# Patient Record
Sex: FEMALE | Race: WHITE | NOT HISPANIC OR LATINO
[De-identification: names, ages, dates, MRNs, and addresses within clinical notes are randomized per-mention and may not be internally consistent; named-entity substitution may affect disease eponyms.]

---

## 2017-08-02 ENCOUNTER — APPOINTMENT (OUTPATIENT)
Dept: NEUROLOGY | Facility: CLINIC | Age: 22
End: 2017-08-02

## 2018-07-25 ENCOUNTER — APPOINTMENT (OUTPATIENT)
Dept: NEUROLOGY | Facility: CLINIC | Age: 23
End: 2018-07-25
Payer: COMMERCIAL

## 2018-07-25 VITALS
HEART RATE: 72 BPM | BODY MASS INDEX: 26.21 KG/M2 | HEIGHT: 59 IN | SYSTOLIC BLOOD PRESSURE: 118 MMHG | WEIGHT: 130 LBS | DIASTOLIC BLOOD PRESSURE: 78 MMHG

## 2018-07-25 DIAGNOSIS — G40.909 EPILEPSY, UNSPECIFIED, NOT INTRACTABLE, W/OUT STATUS EPILEPTICUS: ICD-10-CM

## 2018-07-25 PROCEDURE — 99214 OFFICE O/P EST MOD 30 MIN: CPT

## 2019-03-12 ENCOUNTER — APPOINTMENT (OUTPATIENT)
Dept: UROLOGY | Facility: CLINIC | Age: 24
End: 2019-03-12

## 2019-06-17 ENCOUNTER — MEDICATION RENEWAL (OUTPATIENT)
Age: 24
End: 2019-06-17

## 2019-06-24 ENCOUNTER — RX RENEWAL (OUTPATIENT)
Age: 24
End: 2019-06-24

## 2019-07-24 ENCOUNTER — APPOINTMENT (OUTPATIENT)
Dept: NEUROLOGY | Facility: CLINIC | Age: 24
End: 2019-07-24
Payer: MEDICAID

## 2019-07-24 VITALS
SYSTOLIC BLOOD PRESSURE: 113 MMHG | WEIGHT: 126 LBS | BODY MASS INDEX: 25.4 KG/M2 | HEART RATE: 66 BPM | DIASTOLIC BLOOD PRESSURE: 74 MMHG | HEIGHT: 59 IN

## 2019-07-24 PROCEDURE — 99214 OFFICE O/P EST MOD 30 MIN: CPT

## 2019-07-24 NOTE — HISTORY OF PRESENT ILLNESS
[FreeTextEntry1] : cc- seizures (ref by \par \par HPI- 23 y/o woman with idiopathic partial epilepsy.\par Seen one yr ago. No seizures since. Despite fact that oxcarb seemed to make her seizure free was reluctant to reduce levetiracetam.\par slight fatigue but no side effects.\par \par hx- vasovagal episode\par \par meds- levetiracetam ER 1500 hs\par oxcarb 600 bid\par MVI \par omega 3\par no folate

## 2019-07-24 NOTE — ASSESSMENT
[FreeTextEntry1] : A/P- Idiopathic Partial Epilepsy likely left hemisphere (episode captured at [place]). Has family history. Nl MRIs in past but we have not gotten records.\par - continue lev ER and oxcarbazepine levels\par -  to have labs sent over with levels\par - will consider reducing levetiracetam if level high\par - continue MVI and folate\par - consider repeat MRI after records received\par - RTC 6m\par

## 2019-07-24 NOTE — PHYSICAL EXAM
[FreeTextEntry1] : Neuro Exam-\par alert and oriented x3\par attn conc lang nl, STM wnl\par Cn intact in detail\par Motor 5/5\par sensory intact in detail \par CSG wnl. \par

## 2020-02-13 ENCOUNTER — APPOINTMENT (OUTPATIENT)
Dept: NEUROLOGY | Facility: CLINIC | Age: 25
End: 2020-02-13
Payer: MEDICAID

## 2020-02-13 VITALS
HEART RATE: 76 BPM | DIASTOLIC BLOOD PRESSURE: 80 MMHG | WEIGHT: 126 LBS | BODY MASS INDEX: 25.4 KG/M2 | HEIGHT: 59 IN | SYSTOLIC BLOOD PRESSURE: 125 MMHG

## 2020-02-13 PROCEDURE — 99214 OFFICE O/P EST MOD 30 MIN: CPT

## 2020-02-13 RX ORDER — LEVETIRACETAM 500 MG/1
500 TABLET, FILM COATED, EXTENDED RELEASE ORAL
Refills: 0 | Status: DISCONTINUED | COMMUNITY
End: 2020-02-13

## 2020-02-13 NOTE — HISTORY OF PRESENT ILLNESS
[FreeTextEntry1] : cc- seizures \par \par HPI- 23 y/o woman with idiopathic partial epilepsy.\par Ms Sandra Bacon continues to do well with no interval seizures since her last office visit in July 2019. Her last reported seizure was 3 years ago\par She reports no side effects on her AED regimen. 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

## 2020-02-13 NOTE — ASSESSMENT
[FreeTextEntry1] : A/P- Idiopathic Partial Epilepsy likely left hemisphere (episode captured at [place]). Has family history. Nl MRIs in past but we have not gotten records.\par \par Plan\par - continue Levetiracetam ER and oxcarbazepine, MVI\par - start Folic Acid 3mg daily  (not actively planning pregnancy )\par -annual labs in a few months when she sees PCP  (lab requisition given)\par - reviewed seizure triggers\par - update 48HR AEEG prior to slow Levetiracetam taper ***(will discuss with Dr Cameron)\par - Bone Density Dexa scan for long term OXC use (<3 yrs) r/o osteopenia\par - encouraged 3 servings Ca+ in diet (low weight bearing exercises\par - consider repeat MRI after records received (patient still trying to obtain old med records)\par -follow up in 6 months\par

## 2020-02-26 ENCOUNTER — FORM ENCOUNTER (OUTPATIENT)
Age: 25
End: 2020-02-26

## 2020-02-27 ENCOUNTER — APPOINTMENT (OUTPATIENT)
Dept: RADIOLOGY | Facility: CLINIC | Age: 25
End: 2020-02-27
Payer: MEDICAID

## 2020-02-27 ENCOUNTER — OUTPATIENT (OUTPATIENT)
Dept: OUTPATIENT SERVICES | Facility: HOSPITAL | Age: 25
LOS: 1 days | End: 2020-02-27
Payer: MEDICAID

## 2020-02-27 DIAGNOSIS — G40.909 EPILEPSY, UNSPECIFIED, NOT INTRACTABLE, WITHOUT STATUS EPILEPTICUS: ICD-10-CM

## 2020-02-27 DIAGNOSIS — Z00.8 ENCOUNTER FOR OTHER GENERAL EXAMINATION: ICD-10-CM

## 2020-02-27 PROCEDURE — 77080 DXA BONE DENSITY AXIAL: CPT

## 2020-02-27 PROCEDURE — 77080 DXA BONE DENSITY AXIAL: CPT | Mod: 26

## 2020-05-08 ENCOUNTER — APPOINTMENT (OUTPATIENT)
Dept: NEUROLOGY | Facility: CLINIC | Age: 25
End: 2020-05-08
Payer: MEDICAID

## 2020-05-08 PROCEDURE — 95816 EEG AWAKE AND DROWSY: CPT

## 2020-05-09 PROCEDURE — 95708 EEG WO VID EA 12-26HR UNMNTR: CPT

## 2020-05-10 PROCEDURE — 95708 EEG WO VID EA 12-26HR UNMNTR: CPT

## 2020-05-10 PROCEDURE — 95721 EEG PHY/QHP>36<60 HR W/O VID: CPT

## 2020-05-10 PROCEDURE — 95700 EEG CONT REC W/VID EEG TECH: CPT

## 2020-08-13 ENCOUNTER — APPOINTMENT (OUTPATIENT)
Dept: NEUROLOGY | Facility: CLINIC | Age: 25
End: 2020-08-13
Payer: MEDICAID

## 2020-08-13 VITALS — TEMPERATURE: 97.7 F

## 2020-08-13 VITALS
WEIGHT: 126 LBS | SYSTOLIC BLOOD PRESSURE: 118 MMHG | HEIGHT: 59 IN | DIASTOLIC BLOOD PRESSURE: 77 MMHG | HEART RATE: 75 BPM | BODY MASS INDEX: 25.4 KG/M2

## 2020-08-13 PROCEDURE — 99214 OFFICE O/P EST MOD 30 MIN: CPT

## 2020-08-20 NOTE — ASSESSMENT
[FreeTextEntry1] : A/P- Idiopathic Partial Epilepsy likely left hemisphere (episode captured at [place]). Has family history. Nl MRIs in past but we have not gotten records.\par \par Plan\par - continue Levetiracetam ER and oxcarbazepine, MVI\par - continue  Folic Acid 3mg daily  (not actively planning pregnancy )\par -annual labs in a few months when she sees PCP  (lab requisition given)\par - reviewed seizure triggers\par - 48HR AEEG done on 55/8/20 was normal with no epileptiform activity prior to slow Levetiracetam taper ***(will   discuss with Dr Cameron)\par - Bone Density Dexa scan done in feb 2020 reported as normal  for long term OXC use \par - encouraged 3 servings Ca+ in diet (low weight bearing exercises\par -Follow up with Dr Cameron in 6 months\par - consider repeat MRI after records received (patient still trying to obtain old med records)\par -follow up in 6 months\par

## 2020-08-20 NOTE — HISTORY OF PRESENT ILLNESS
[FreeTextEntry1] : cc- seizures \par \par ***UPDATE:8/13/20***\par Patient reports no interval seizures since last office visit in Feb. Last documented seizure was ~ 3.5 years \par She is doing well >Not actively planning pregnancy\par \par Levetiracetam 1500mg At bed time\par Oxcarbazepine 600mg BID\par Folic acid 3 mg daily\par \par ***UPDATE2/13/20***\par HPI- 23 y/o woman with idiopathic partial epilepsy.\par Ms Sandra Bacon continues to do well with no interval seizures since her last office visit in July 2019. Her last reported seizure was 3 years ago\par She reports no side effects on her AED regimen. 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

## 2020-12-17 ENCOUNTER — TRANSCRIPTION ENCOUNTER (OUTPATIENT)
Age: 25
End: 2020-12-17

## 2021-02-17 ENCOUNTER — APPOINTMENT (OUTPATIENT)
Dept: NEUROLOGY | Facility: CLINIC | Age: 26
End: 2021-02-17

## 2021-02-23 ENCOUNTER — APPOINTMENT (OUTPATIENT)
Dept: NEUROLOGY | Facility: CLINIC | Age: 26
End: 2021-02-23
Payer: MEDICAID

## 2021-02-23 PROCEDURE — 99214 OFFICE O/P EST MOD 30 MIN: CPT | Mod: 95

## 2021-02-23 NOTE — ASSESSMENT
[FreeTextEntry1] : A/P- Idiopathic Partial Epilepsy likely left hemisphere (episode captured at [place]). Has family history. Nl MRIs in past but we have not gotten records.\par \par Plan\par - continue Levetiracetam ER and oxcarbazepine, MVI\par - continue Folic Acid 3mg daily (not actively planning pregnancy )\par - rec MVI\par - sending over recent labs\par - reviewed seizure triggers\par - RTC 1 yr\par

## 2021-02-23 NOTE — HISTORY OF PRESENT ILLNESS
[Home] : at home, [unfilled] , at the time of the visit. [Medical Office: ([facility])___] : at the medical office located in  [Verbal consent obtained from patient] : the patient, [unfilled] [FreeTextEntry1] : cc- seizures\par \par HPI- Since last visit no seizures.\par Last seizure was 7/31/17.\par Missed meds twice but no issues.\par No tongue bites or incontinence.\par Denies adverse effects, slight tiredenss\par \par Meds-\par Levetiracetam ER 1500mg at bed time\par Oxcarbazepine 600mg BID\par Folic acid 3mg daily\par \par \par In July 2016 was home, walked upstairs and was found sitting on floor with her arm stiff (mother believes it is the right). Taken to [facility]. Later to [facility] where a 4 day VEEG was nl. Discharged on Keppra 500 bid. Continued to have episodes. In Sept 2016 had a couple of events at boyfriends house while lying down. May have had a tongue bite and incontinence. Taken to [facility] where she had a third seizure in ED. \par Keppra was increased but had a breakthrough in Jan 2017. Keppra was increased again and had no seizures until 7/31/17. Had three seizures after sleep deprived and stressed and ?premenstrually. Had an EEG and showed a left hemisphere onset seizure with head and eye deviation to right. Was placed on oxcarbazepine and has been seizure free since. \par MRIs have been nl. \par \par Main c/o sedation from the oxcarbazepine. Irritability from Keppra improved with oxcarbazepine.\par

## 2021-02-23 NOTE — PHYSICAL EXAM
[FreeTextEntry1] : \par Neuro Exam-\par alert and oriented x3\par attn conc lang nl, STM wnl\par Cn intact in detail\par Motor 5/5\par sensory intact in detail \par CSG wnl.

## 2021-02-23 NOTE — ASSESSMENT
[FreeTextEntry1] : A/P- Idiopathic Partial Epilepsy likely left hemisphere (episode captured at [place]). Has family history. Nl MRIs in past but we have not gotten records.\par \par Plan\par - continue Levetiracetam ER and oxcarbazepine, MVI\par - continue Folic Acid 3mg daily (not actively planning pregnancy )\par - rec MVI\par - sending over recent labs\par - reviewed seizure triggers\par - RTC 1 yr\par

## 2021-02-23 NOTE — HISTORY OF PRESENT ILLNESS
[Home] : at home, [unfilled] , at the time of the visit. [Medical Office: ([facility])___] : at the medical office located in  [Verbal consent obtained from patient] : the patient, [unfilled] [FreeTextEntry1] : cc- seizures\par \par HPI- Since last visit no seizures.\par Last seizure was 7/31/17.\par Missed meds twice but no issues.\par No tongue bites or incontinence.\par Denies adverse effects, slight tiredenss\par \par Meds-\par Levetiracetam ER 1500mg at bed time\par Oxcarbazepine 600mg BID\par Folic acid 3mg daily\par \par \par In July 2016 was home, walked upstairs and was found sitting on floor with her arm stiff (mother believes it is the right). Taken to [facility]. Later to [facility] where a 4 day VEEG was nl. Discharged on Keppra 500 bid. Continued to have episodes. In Sept 2016 had a couple of events at boyfriends house while lying down. May have had a tongue bite and incontinence. Taken to [facility] where she had a third seizure in ED. \par Keppra was increased but had a breakthrough in Jan 2017. Keppra was increased again and had no seizures until 7/31/17. Had three seizures after sleep deprived and stressed and ?premenstrually. Had an EEG and showed a left hemisphere onset seizure with head and eye deviation to right. Was placed on oxcarbazepine and has been seizure free since. \par MRIs have been nl. \par \par Main c/o sedation from the oxcarbazepine. Irritability from Keppra improved with oxcarbazepine.\par

## 2021-03-06 ENCOUNTER — EMERGENCY (EMERGENCY)
Facility: HOSPITAL | Age: 26
LOS: 0 days | Discharge: HOME | End: 2021-03-07
Attending: EMERGENCY MEDICINE
Payer: MEDICAID

## 2021-03-06 VITALS — HEIGHT: 59 IN | WEIGHT: 136.03 LBS

## 2021-03-06 DIAGNOSIS — R20.0 ANESTHESIA OF SKIN: ICD-10-CM

## 2021-03-06 DIAGNOSIS — F32.9 MAJOR DEPRESSIVE DISORDER, SINGLE EPISODE, UNSPECIFIED: ICD-10-CM

## 2021-03-06 DIAGNOSIS — F41.9 ANXIETY DISORDER, UNSPECIFIED: ICD-10-CM

## 2021-03-06 DIAGNOSIS — G40.909 EPILEPSY, UNSPECIFIED, NOT INTRACTABLE, WITHOUT STATUS EPILEPTICUS: ICD-10-CM

## 2021-03-06 DIAGNOSIS — R20.2 PARESTHESIA OF SKIN: ICD-10-CM

## 2021-03-06 DIAGNOSIS — Z86.69 PERSONAL HISTORY OF OTHER DISEASES OF THE NERVOUS SYSTEM AND SENSE ORGANS: ICD-10-CM

## 2021-03-06 LAB
ADD ON TEST-SPECIMEN IN LAB: SIGNIFICANT CHANGE UP
ALBUMIN SERPL ELPH-MCNC: 4.3 G/DL — SIGNIFICANT CHANGE UP (ref 3.3–5)
ALP SERPL-CCNC: 60 U/L — SIGNIFICANT CHANGE UP (ref 40–120)
ALT FLD-CCNC: 18 U/L — SIGNIFICANT CHANGE UP (ref 12–78)
ANION GAP SERPL CALC-SCNC: 7 MMOL/L — SIGNIFICANT CHANGE UP (ref 5–17)
APPEARANCE UR: CLEAR — SIGNIFICANT CHANGE UP
AST SERPL-CCNC: 11 U/L — LOW (ref 15–37)
BASOPHILS # BLD AUTO: 0.02 K/UL — SIGNIFICANT CHANGE UP (ref 0–0.2)
BASOPHILS NFR BLD AUTO: 0.4 % — SIGNIFICANT CHANGE UP (ref 0–2)
BILIRUB SERPL-MCNC: 0.5 MG/DL — SIGNIFICANT CHANGE UP (ref 0.2–1.2)
BILIRUB UR-MCNC: NEGATIVE — SIGNIFICANT CHANGE UP
BUN SERPL-MCNC: 8 MG/DL — SIGNIFICANT CHANGE UP (ref 7–23)
CALCIUM SERPL-MCNC: 8.8 MG/DL — SIGNIFICANT CHANGE UP (ref 8.5–10.1)
CHLORIDE SERPL-SCNC: 102 MMOL/L — SIGNIFICANT CHANGE UP (ref 96–108)
CO2 SERPL-SCNC: 26 MMOL/L — SIGNIFICANT CHANGE UP (ref 22–31)
COLOR SPEC: YELLOW — SIGNIFICANT CHANGE UP
CREAT SERPL-MCNC: 0.74 MG/DL — SIGNIFICANT CHANGE UP (ref 0.5–1.3)
DIFF PNL FLD: ABNORMAL
EOSINOPHIL # BLD AUTO: 0 K/UL — SIGNIFICANT CHANGE UP (ref 0–0.5)
EOSINOPHIL NFR BLD AUTO: 0 % — SIGNIFICANT CHANGE UP (ref 0–6)
GLUCOSE SERPL-MCNC: 125 MG/DL — HIGH (ref 70–99)
GLUCOSE UR QL: NEGATIVE MG/DL — SIGNIFICANT CHANGE UP
HCT VFR BLD CALC: 41 % — SIGNIFICANT CHANGE UP (ref 34.5–45)
HGB BLD-MCNC: 14.7 G/DL — SIGNIFICANT CHANGE UP (ref 11.5–15.5)
IMM GRANULOCYTES NFR BLD AUTO: 0.4 % — SIGNIFICANT CHANGE UP (ref 0–1.5)
KETONES UR-MCNC: NEGATIVE — SIGNIFICANT CHANGE UP
LEUKOCYTE ESTERASE UR-ACNC: NEGATIVE — SIGNIFICANT CHANGE UP
LYMPHOCYTES # BLD AUTO: 0.66 K/UL — LOW (ref 1–3.3)
LYMPHOCYTES # BLD AUTO: 11.8 % — LOW (ref 13–44)
MCHC RBC-ENTMCNC: 31.5 PG — SIGNIFICANT CHANGE UP (ref 27–34)
MCHC RBC-ENTMCNC: 35.9 GM/DL — SIGNIFICANT CHANGE UP (ref 32–36)
MCV RBC AUTO: 87.8 FL — SIGNIFICANT CHANGE UP (ref 80–100)
MONOCYTES # BLD AUTO: 0.31 K/UL — SIGNIFICANT CHANGE UP (ref 0–0.9)
MONOCYTES NFR BLD AUTO: 5.5 % — SIGNIFICANT CHANGE UP (ref 2–14)
NEUTROPHILS # BLD AUTO: 4.58 K/UL — SIGNIFICANT CHANGE UP (ref 1.8–7.4)
NEUTROPHILS NFR BLD AUTO: 81.9 % — HIGH (ref 43–77)
NITRITE UR-MCNC: NEGATIVE — SIGNIFICANT CHANGE UP
PH UR: 6.5 — SIGNIFICANT CHANGE UP (ref 5–8)
PLATELET # BLD AUTO: 272 K/UL — SIGNIFICANT CHANGE UP (ref 150–400)
POTASSIUM SERPL-MCNC: 3.8 MMOL/L — SIGNIFICANT CHANGE UP (ref 3.5–5.3)
POTASSIUM SERPL-SCNC: 3.8 MMOL/L — SIGNIFICANT CHANGE UP (ref 3.5–5.3)
PROT SERPL-MCNC: 8.1 GM/DL — SIGNIFICANT CHANGE UP (ref 6–8.3)
PROT UR-MCNC: NEGATIVE MG/DL — SIGNIFICANT CHANGE UP
RBC # BLD: 4.67 M/UL — SIGNIFICANT CHANGE UP (ref 3.8–5.2)
RBC # FLD: 11.7 % — SIGNIFICANT CHANGE UP (ref 10.3–14.5)
SODIUM SERPL-SCNC: 135 MMOL/L — SIGNIFICANT CHANGE UP (ref 135–145)
SP GR SPEC: 1.01 — SIGNIFICANT CHANGE UP (ref 1.01–1.02)
TSH SERPL-MCNC: 1.35 UU/ML — SIGNIFICANT CHANGE UP (ref 0.34–4.82)
UROBILINOGEN FLD QL: NEGATIVE MG/DL — SIGNIFICANT CHANGE UP
WBC # BLD: 5.59 K/UL — SIGNIFICANT CHANGE UP (ref 3.8–10.5)
WBC # FLD AUTO: 5.59 K/UL — SIGNIFICANT CHANGE UP (ref 3.8–10.5)

## 2021-03-06 PROCEDURE — 87086 URINE CULTURE/COLONY COUNT: CPT

## 2021-03-06 PROCEDURE — 93010 ELECTROCARDIOGRAM REPORT: CPT

## 2021-03-06 PROCEDURE — 93005 ELECTROCARDIOGRAM TRACING: CPT

## 2021-03-06 PROCEDURE — 81025 URINE PREGNANCY TEST: CPT

## 2021-03-06 PROCEDURE — 81001 URINALYSIS AUTO W/SCOPE: CPT

## 2021-03-06 PROCEDURE — 80307 DRUG TEST PRSMV CHEM ANLYZR: CPT

## 2021-03-06 PROCEDURE — 84443 ASSAY THYROID STIM HORMONE: CPT

## 2021-03-06 PROCEDURE — 99284 EMERGENCY DEPT VISIT MOD MDM: CPT

## 2021-03-06 PROCEDURE — 36415 COLL VENOUS BLD VENIPUNCTURE: CPT

## 2021-03-06 PROCEDURE — 80053 COMPREHEN METABOLIC PANEL: CPT

## 2021-03-06 PROCEDURE — 85025 COMPLETE CBC W/AUTO DIFF WBC: CPT

## 2021-03-06 RX ORDER — SODIUM CHLORIDE 9 MG/ML
1000 INJECTION INTRAMUSCULAR; INTRAVENOUS; SUBCUTANEOUS ONCE
Refills: 0 | Status: COMPLETED | OUTPATIENT
Start: 2021-03-06 | End: 2021-03-06

## 2021-03-06 RX ORDER — ALPRAZOLAM 0.25 MG
0.25 TABLET ORAL ONCE
Refills: 0 | Status: DISCONTINUED | OUTPATIENT
Start: 2021-03-06 | End: 2021-03-06

## 2021-03-06 RX ADMIN — SODIUM CHLORIDE 1000 MILLILITER(S): 9 INJECTION INTRAMUSCULAR; INTRAVENOUS; SUBCUTANEOUS at 19:52

## 2021-03-06 RX ADMIN — Medication 0.25 MILLIGRAM(S): at 20:12

## 2021-03-06 NOTE — ED PROVIDER NOTE - PROGRESS NOTE DETAILS
JUAN CARLOSG: Received signout from Dr. Dow to follow up telepsych consult recommendations.  Patient is cleared and may be discharged with outpatient psychiatry referral to be faxed.  Patient has anxiety but no indication for admission.

## 2021-03-06 NOTE — ED PROVIDER NOTE - ENMT, MLM
Airway patent, Nasal mucosa clear. Mouth with normal mucosa. Throat has no vesicles, no oropharyngeal exudates and uvula is midline. OP clear. Airway patent, Nasal mucosa clear. Mouth with normal mucosa. Throat has no vesicles, no oropharyngeal exudates and uvula is midline. OP clear. No septal hematoma or anomaly.

## 2021-03-06 NOTE — ED PROVIDER NOTE - NSFOLLOWUPCLINICS_GEN_ALL_ED_FT
A Psychiatrist  Psychiatry  .  [place]   Phone:   Fax:   Follow Up Time:     A Therapist  Psychiatry  .  [place]   Phone:   Fax:   Follow Up Time:

## 2021-03-06 NOTE — ED PROVIDER NOTE - PSYCHIATRIC, MLM
Alert and oriented to person, place, time/situation. normal mood and affect. no apparent risk to self or others. Alert and oriented to person, place, time/situation. normal mood and slightly anxious affect. no apparent risk to self or others.

## 2021-03-06 NOTE — ED PROVIDER NOTE - PATIENT PORTAL LINK FT
You can access the FollowMyHealth Patient Portal offered by [facility] by registering at the following website: [email or web address]. By joining [facility]’s FollowMyHealth portal, you will also be able to view your health information using other applications (apps) compatible with our system.

## 2021-03-06 NOTE — ED STATDOCS - PMH
Hypotension, unspecified hypotension type    Migraine without status migrainosus, not intractable, unspecified migraine type

## 2021-03-06 NOTE — ED PROVIDER NOTE - CROS ED NEURO POS
neck numbness, tingling in feet neck numbness, tingling in feet, resolved in ED, states similar to his previous panic attacks

## 2021-03-06 NOTE — ED PROVIDER NOTE - NEUROLOGICAL, MLM
Alert and oriented, no focal deficits, no motor or sensory deficits. CN 2-12 intact, NIH 0, GCS 15. Alert and oriented, no focal deficits, no motor or sensory deficits. CN 2-12 intact, NIH 0, GCS 15. No dysmetria.

## 2021-03-06 NOTE — ED ADULT NURSE NOTE - CHIEF COMPLAINT QUOTE
c/o multiple panic attacks for past 3 days, patient states she has been feeling she is going to have a seizure, c/o pain behind ears and shaking, however patient has not had seizure, pt started on lexapro today, denies suicidal/homicidal ideation HX: seizure, patient's mother [place] Bacon #[phone]

## 2021-03-06 NOTE — ED PROVIDER NOTE - NS_ ATTENDINGSCRIBEDETAILS _ED_A_ED_FT
I Tavon Dow MD saw and examined the patient. Scribe documented for me and under my supervision. I have modified the scribe's documentation where necessary to reflect my history, physical exam and other relevant documentations pertinent to the care of the patient.

## 2021-03-06 NOTE — ED PROVIDER NOTE - NSFOLLOWUPINSTRUCTIONS_ED_ALL_ED_FT
Please follow up with your primary care physician. Please follow up with your psychiatrist, if you do not have your own, please contact the number provided here for you or the number provided for you by the telepsychiatry physician that you spoke with in the ER. Please note that upon leaving the emergency room you have no chest pain, shortness of breath, palpitation, lightheadedness, dizziness, weakness, or any other complaints, but should you develop any of these symptoms, return to us immediately. Please follow recommendations as set by the telepsychiatry attending.    Please return to us immediately if you develop any sad thoughts, thoughts about hurting yourself or others, or if you have difficulty eating, drinking, sleeping or carrying on with daily activities. Please note that we have provided you with the copies of yours labs, so please ensure your primary care physician gets to see them and evaluate them, and if you do not have a primary care pediatrician, please contact the number provided here for [facility] which is a family [facility] center. Please note that you have received a medication called "xanax' which is an anti anxiety medication, this medication can linger in your body for hours, and even though you feel normal, you might be quite fully capable of doing sensitive tasks, such as driving, operating machinery, or doing sensitive tasks at least for the next 8 hours so please avoid these.    __________________________      Anxiety    WHAT YOU NEED TO KNOW:    Anxiety is a condition that causes you to feel extremely worried or nervous. The feelings are so strong that they can cause problems with your daily activities or sleep. Anxiety may be triggered by something you fear, or it may happen without a cause. Family or work stress, smoking, caffeine, and alcohol can increase your risk for anxiety. Certain medicines or health conditions can also increase your risk. Anxiety can become a long-term condition if it is not managed or treated.    DISCHARGE INSTRUCTIONS:    Call your local emergency number (911 in the [place]) if:   •You have chest pain, tightness, or heaviness that may spread to your shoulders, arms, jaw, neck, or back.      •You feel like hurting yourself or someone else.      Call your doctor if:   •Your symptoms get worse or do not get better with treatment.      •Your anxiety keeps you from doing your regular daily activities.      •You have new symptoms since your last visit.      •You have questions or concerns about your condition or care.      Medicines:   •Medicines may be given to help you feel more calm and relaxed, and decrease your symptoms.      •Take your medicine as directed. Contact your healthcare provider if you think your medicine is not helping or if you have side effects. Tell him of her if you are allergic to any medicine. Keep a list of the medicines, vitamins, and herbs you take. Include the amounts, and when and why you take them. Bring the list or the pill bottles to follow-up visits. Carry your medicine list with you in case of an emergency.      Manage anxiety:   •Talk to someone about your anxiety. Your healthcare provider may suggest counseling. Cognitive behavioral therapy can help you understand and change how you react to events that trigger your symptoms. You might feel more comfortable talking with a friend or family member about your anxiety. Choose someone you know will be supportive and encouraging.      •Find ways to relax. Activities such as exercise, meditation, or listening to music can help you relax. Spend time with friends, or do things you enjoy.      •Practice deep breathing. Deep breathing can help you relax when you feel anxious. Focus on taking slow, deep breaths several times a day, or during an anxiety attack. Breathe in through your nose and out through your mouth.      •Create a regular sleep routine. Regular sleep can help you feel calmer during the day. Go to sleep and wake up at the same times every day. Do not watch television or use the computer right before bed. Your room should be comfortable, dark, and quiet.      •Eat a variety of healthy foods. Healthy foods include fruits, vegetables, low-fat dairy products, lean meats, fish, whole-grain breads, and cooked beans. Healthy foods can help you feel less anxious and have more energy.  Healthy Foods           •Exercise regularly. Exercise can increase your energy level. Exercise may also lift your mood and help you sleep better. Your healthcare provider can help you create an exercise plan.  Walking for Exercise           •Do not smoke. Nicotine and other chemicals in cigarettes and cigars can increase anxiety. Ask your healthcare provider for information if you currently smoke and need help to quit. E-cigarettes or smokeless tobacco still contain nicotine. Talk to your healthcare provider before you use these products.      •Do not have caffeine. Caffeine can make your symptoms worse. Do not have foods or drinks that are meant to increase your energy level.      •Limit or do not drink alcohol. Ask your healthcare provider if alcohol is safe for you. You may not be able to drink alcohol if you take certain anxiety or depression medicines. Limit alcohol to 1 drink per day if you are a woman. Limit alcohol to 2 drinks per day if you are a man. A drink of alcohol is 12 ounces of beer, 5 ounces of wine, or 1½ ounces of liquor.      •Do not use drugs. Drugs can make your anxiety worse. It can also make anxiety hard to manage. Talk to your healthcare provider if you use drugs and want help to quit.      Follow up with your doctor within 2 weeks or as directed: Write down your questions so you remember to ask them during your visits.

## 2021-03-06 NOTE — ED PROVIDER NOTE - CARE PLAN
Principal Discharge DX:	Anxiety  Secondary Diagnosis:	Depression, unspecified depression type   Principal Discharge DX:	Anxiety  Goal:	patient requests elective psychiatric evaluation,  Secondary Diagnosis:	Depression, unspecified depression type

## 2021-03-06 NOTE — ED ADULT NURSE NOTE - OBJECTIVE STATEMENT
Pt presents to Ed c/o anxiety. Pt reports feeling anxious since wednesday. +neck pain, chest pain, and bilateral foot numbness. pt wiuth hx of seizures and reports being started on Lexapro by neurologist for anxiety.

## 2021-03-06 NOTE — ED PROVIDER NOTE - MUSCULOSKELETAL, MLM
Spine appears normal, range of motion is not limited, no muscle or joint tenderness, 5/5 strength to upper and lower extremities, no nuchal rigidity, 2+ DP and radial pulses b/l Spine appears normal, range of motion is not limited, no muscle or joint tenderness, 5/5 strength to upper and lower extremities on flexion and extenion, no nuchal rigidity, 2+ DP and radial pulses b/l

## 2021-03-06 NOTE — ED PROVIDER NOTE - CLINICAL SUMMARY MEDICAL DECISION MAKING FREE TEXT BOX
Pt with tingling of arms, anxiety, panic attacks, no recent imaging. Will offer  CT head, basic blood work, anxiolysis. Psych evaluation offered to pt, pt is amiable. Pt has no SI/HI, psych evaluation is purely electable.

## 2021-03-06 NOTE — ED STATDOCS - PROGRESS NOTE DETAILS
Manisha Wallis for attending Dr. Yancey: 26 y/o female with a PMHx of hypotension, migraine, seizures, presents to the ED c/o multiple panic attacks for past 3 days. Pt reports current pain behind ears, shaking, back of neck tightness, tingling in feet. Pt was started on 5mg Lexapro this morning, prescribed by neuro Dr. Paulson. Pt reports decreased PO intake x3 days. States she has been feeling as though she is going to have a seizure. No other complaints at this time. Will send pt to main ED for further evaluation.

## 2021-03-06 NOTE — ED PROVIDER NOTE - OBJECTIVE STATEMENT
24 y/o female with PMHx of hypotension, migraines, seizures (tonic-clonic), presents to the ED c/o multiple, intermittent panic attacks for past 3 days. Pt reports feeling anxious with shaking, neck numbness, tingling in feet, chest pain. Pt was started on 5mg Lexapro this morning, prescribed by neurologist Dr. Paulson. Pt reports decreased PO intake x3 days. States she has been feeling as though she is going to have a seizure. Last CT scan x3-4 years ago. Denies SI/HI. Neurologist: Dr. Cameron. Pt denies any abd pain, SOB, vaginal bleeding, recent trauma. Denies any illegal drug use. Non-drinker. 24 y/o female with PMHx of hypotension, migraines, seizures (tonic-clonic), presents to the ED c/o multiple, intermittent panic attacks for past 3 days. Pt reports feeling anxious with shaking, neck numbness, tingling in feet, chest pain. Pt was started on 5mg Lexapro this morning, prescribed by neurologist Dr. Paulson. Pt reports decreased PO intake x3 days. States she has been feeling as though she is going to have a seizure. Last CT scan x3-4 years ago. Denies SI/HI. Neurologist: Dr. Mya Wilkerson. Pt denies any abd pain, SOB, vaginal bleeding, recent trauma. In the ed has no HA, cp, sob, palpitation, lightheadedness, seizures, fever, chills, skin rash, neck pain or stiffness, saddle anesthesia, and denies any incontinence of urine or stool, urinary retention, or any other active complaint other than anxiety.  Denies any illegal drug use. Non-drinker.

## 2021-03-06 NOTE — ED PROVIDER NOTE - CONSTITUTIONAL, MLM
Well appearing, awake, alert, oriented to person, place, time/situation and in no apparent distress. Non-toxic. normal... Well appearing, awake, alert, oriented to person, place, time/situation and in no apparent distress. Non-toxic appearing. AAOx4. anxious affect

## 2021-03-06 NOTE — ED ADULT TRIAGE NOTE - CHIEF COMPLAINT QUOTE
c/o multiple panic attacks for past 3 days, patient states she has been feeling she is going to have a seizure, c/o pain behind ears and shaking, however patient has not had seizure, pt started on lexapro today, denies suicidal/homicidal ideation HX: seizure, patient's mother [place] Bacon #[phone]

## 2021-03-07 VITALS
SYSTOLIC BLOOD PRESSURE: 112 MMHG | RESPIRATION RATE: 16 BRPM | HEART RATE: 74 BPM | DIASTOLIC BLOOD PRESSURE: 78 MMHG | TEMPERATURE: 98 F | OXYGEN SATURATION: 97 %

## 2021-03-07 DIAGNOSIS — F41.9 ANXIETY DISORDER, UNSPECIFIED: ICD-10-CM

## 2021-03-07 LAB
CULTURE RESULTS: SIGNIFICANT CHANGE UP
SPECIMEN SOURCE: SIGNIFICANT CHANGE UP

## 2021-03-07 PROCEDURE — 90792 PSYCH DIAG EVAL W/MED SRVCS: CPT | Mod: 95

## 2021-03-07 RX ORDER — ACETAMINOPHEN 500 MG
650 TABLET ORAL ONCE
Refills: 0 | Status: COMPLETED | OUTPATIENT
Start: 2021-03-07 | End: 2021-03-07

## 2021-03-07 RX ADMIN — Medication 650 MILLIGRAM(S): at 02:53

## 2021-03-07 NOTE — ED BEHAVIORAL HEALTH ASSESSMENT NOTE - OTHER PAST PSYCHIATRIC HISTORY (INCLUDE DETAILS REGARDING ONSET, COURSE OF ILLNESS, INPATIENT/OUTPATIENT TREATMENT)
-Diagnosis:  anxiety  -Psych hospitalizations:  denies  -SA/HA:  denies pSA and pHA and denies engaging in SIB  -Current meds: Lexapro 5mg PO qdaily, keppra 1500mg qhs, oxcarbazepine 600mg BID  -Outpatient psychiatric care:   has therapist appointment at [facility] at [facility] this upcoming Tuesday

## 2021-03-07 NOTE — ED BEHAVIORAL HEALTH ASSESSMENT NOTE - DETAILS
dad has depression, Maternal GM has anxiety yes patient denies SI with no plan or intent see separate note

## 2021-03-07 NOTE — ED BEHAVIORAL HEALTH ASSESSMENT NOTE - HPI (INCLUDE ILLNESS QUALITY, SEVERITY, DURATION, TIMING, CONTEXT, MODIFYING FACTORS, ASSOCIATED SIGNS AND SYMPTOMS)
This is a 25 year old  female, domiciled in [place], [place], with her parents and brother, in a relationship, employed, in school for nursing, history of anxiety, panic attacks, no prior psych hospitalizations, no prior SA, no SIB, history of seizures, who was BIBS for frequent panic attacks.    Patient reports that she had a panic attack this past Wednesday at 10:30pm at her boyfriends place.  She felt palpitations, racing thoughts, pain her left arm, shaking.  She went home that night and tried to relax.  It took her 2 hours to fall asleep.  Since Wednesday her anxiety and panic attacks have been coming in “waves” and she wasn’t feeling better [facility]  She reports multiple stressors at home.  States her dad had his leg amputated and he and her mother are fighting more frequently.  She states her mom has seizures and mother  can’t drive so she has to help both her parents get around. 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  She also has memories of her uncle who passed away in Oct 2020.  Lastly she has history of seizures which is stressful as well.      In the ER [facility] she received Xanax 0.25mg and now she feels much more calm.  She reports she was started on Lexapro 5mg Po qam today by her Neurologist.  She has tried to utilize coping skills such as deep breathing, playing cards, watching tv.  She denies symptoms of depression.  She denies feeling depressed and hopeless.  She denies suicidal ideations with no plan or intent.  She denies insomnia.  She reports poor appetite and variable energy.  She denies anhedonia and enjoys spending time with her boyfriend and going camping.  She reports she has a good relationship with family members.  She denies symptoms of psychosis and hayes.  She denies AH/VH/HI and paranoia.  She denies using illicit drugs and alcohol.

## 2021-03-07 NOTE — ED BEHAVIORAL HEALTH ASSESSMENT NOTE - DESCRIPTION
domiciled in [place], [place], with her parents and brother, in a relationship, employed, in school for nursing see BTCM note seizures

## 2021-03-07 NOTE — ED BEHAVIORAL HEALTH ASSESSMENT NOTE - RISK ASSESSMENT
Risk factors include history of anxiety, panic attacks, lack of outpatient psych care, family history, history of seizure.  Protective factors include supportive family and boyfriend, no history of substance use, no acute medical problems, no prior history of suicidal attempts, currently she denies SI and HI with no plan or intent, no access to weapons, future oriented, goal oriented, help seeking Low Acute Suicide Risk

## 2021-03-07 NOTE — ED BEHAVIORAL HEALTH ASSESSMENT NOTE - SUMMARY
This is a 25 year old  female, domiciled in [place], [place], with her parents and brother, in a relationship, employed, in school for nursing, history of anxiety, panic attacks, no prior psych hospitalizations, no prior SA, no SIB, history of seizures, who was BIBS for frequent panic attacks.  Patient has had increasing panic attacks and anxiety in the context of psychosocial stressors mentioned above.  She received Xanax 0.25mg in the ER and feels much more calm.  She reports she is able to utilize coping skills mentioned in HPI and feels safe to go home.  She has therapy appointment next Tuesday.      Patient currently denies SI and HI with no plan or intent.  Patient does not appear internally pre-occupied, has logical and goal oriented thought process, and has been in good behavioral control.  Based on evaluation today, patient is not deemed to be acutely psychotic, manic, suicidal, or homicidal.  Patient is therefore not deemed to be an acute danger to herself or others and does not meet criteria for inpatient psychiatric hospitalization.  She can be safely discharged home at this time. Patient was told that if there are any safety concerns, to come back to the ED or call 911 immediately.  Discussed safety plan with patient.     COVID Exposure Screen- Patient  1. *Have you had a COVID-19 test in the last 90 days?  NO  2.  *Have you tested positive for COVID-19 antibodies?  NO  3.  *Have you received 2 doses of the COVID-19 vaccine?  NO  4.  *In the past 10 days, have you been around anyone with a positive COVID-19 test?* NO  5.  *Have you been out of [place] within the past 10 days?*  NO    Plan T&R:  1.  Patient can be discharged home  2.  May continue with current psychotropic regimen,  3.  Given referrals for outpatient psychiatric care  4.  She has therapy appointment on Tuesday, 3/9/21  5.  Patient was given hotline number, instructed to call 911 or go to the nearest ED for worsening symptoms, suicidality/homicidality.  6.   Reviewed safety plan with patient

## 2021-03-07 NOTE — ED BEHAVIORAL HEALTH ASSESSMENT NOTE - NSSUICPROTFACT_PSY_ALL_CORE
Responsibility to children, family, or others/Identifies reasons for living/Supportive social network of family or friends/Engaged in work or school/Positive therapeutic relationships/Ability to cope with stress

## 2021-03-07 NOTE — ED BEHAVIORAL HEALTH NOTE - BEHAVIORAL HEALTH NOTE
===================  PRE-HOSPITAL COURSE  ===================  SOURCE:  Triage documentation.   DETAILS:  Patient presented self to ED; chief complaint of anxiety.   ============  ED COURSE   ============  SOURCE:  RN and ED chart documentation.   ARRIVAL:  Patient was calm and cooperative with triage process upon arrival. Patient presents with good hygiene/grooming.   BELONGINGS:  Nothing notable; patient still in possession of her belongings.   BEHAVIOR: Patient was cooperative and gave blood/urine for routine labs without documented incident. Patient denies SI/HI/AH/VH; does endorse feeling as if she may have a panic attack/increased anxiety. Patient is appropriate with hospital staff; makes good eye contact. Patient’s speech is of normal volume/rate accompanied by a logical thought process; patient is AOx4. Patient has been resting in hospital bed.    TREATMENT:  Patient received Xanax .25mg PO; tolerated well.   VISITORS:  Patient presently unaccompanied by social supports in ED.     COVID Exposure Screen- collateral (i.e. third-party, chart review, belongings, etc; include EMS and ED staff)  1.	*Has the patient had a COVID-19 test in the last 90 days?  (  ) Yes   ( X) No   (  ) Unknown- Reason: _____  IF YES PROCEED TO QUESTION #2. IF NO OR UNKNOWN, PLEASE SKIP TO QUESTION #3.  2.	Date of test(s) and result(s): ________  3.	*Has the patient tested positive for COVID-19 antibodies? ( X) Yes   (  ) No   (  ) Unknown- Reason: _____  IF YES PROCEED TO QUESTION #4. IF NO or UNKNOWN, PLEASE SKIP TO QUESTION #5.  4.	Date of positive antibody test: ___2/8/2021_____  5.	*Has the patient received 2 doses of the COVID-19 vaccine? (  ) Yes   ( X) No   (  ) Unknown- Reason: _____  IF YES PROCEED TO QUESTION #6. IF NO or UNKNOWN, PLEASE SKIP TO QUESTION #7.  6.	 Date of second dose: ________  7.	*In the past 10 days, has the patient been around anyone with a positive COVID-19 test?* (  ) Yes   ( X) No   (  ) Unknown- Reason: __  IF YES PROCEED TO QUESTION #8. IF NO or UNKNOWN, PLEASE SKIP TO QUESTION #13.  8.	Was the patient within 6 feet of them for at least 15 minutes? (  ) Yes   (  ) No   (  ) Unknown- Reason: _____  9.	Did the patient provide care for them? (  ) Yes   (  ) No   (  ) Unknown- Reason: ______  10.	Did the patient have direct physical contact with them (touched, hugged, or kissed them)? (  ) Yes   (  ) No    (  ) Unknown- Reason: __  11.	Did the patient share eating or drinking utensils with them? (  ) Yes   (  ) No    (  ) Unknown- Reason: ____  12.	Did they sneeze, cough, or somehow get respiratory droplets on the patient? (  ) Yes   (  ) No    (  ) Unknown- Reason: ______  13.	*Has the patient been out of [place] within the past 10 days?* (  ) Yes   ( X) No   (  ) Unknown- Reason: _____  IF YES PLEASE ANSWER THE FOLLOWING QUESTIONS:  14.	Which state/country did they go to? ______  15.	Were they there over 24 hours? (  ) Yes   (  ) No    (  ) Unknown- Reason: ______

## 2021-03-10 ENCOUNTER — APPOINTMENT (OUTPATIENT)
Dept: NEUROLOGY | Facility: CLINIC | Age: 26
End: 2021-03-10
Payer: MEDICAID

## 2021-03-10 PROCEDURE — 99213 OFFICE O/P EST LOW 20 MIN: CPT | Mod: 95

## 2021-03-10 NOTE — HISTORY OF PRESENT ILLNESS
[Home] : at home, [unfilled] , at the time of the visit. [Medical Office: ([facility])___] : at the medical office located in  [Verbal consent obtained from patient] : the patient, [unfilled] [FreeTextEntry1] : cc- seizures\par \par HPI- After the follow up visit began feeling depressed.  then last week had a "panic attack" that would not stop. Uncomfortable, hear racing. Was still there when she went to sleep.  Was not eating. \par Saturday went to  where they gave her 0.25 mg of Xanax.  Had a telehealth with psychiatrist who thought first dose of Lexapro may have worsened things. Saw a therapist yesterday. Now starting to feel better. took leave from school.\par \par Meds-\par Levetiracetam ER 1500mg at bed time\par Oxcarbazepine 600mg BID\par escitalopram 5mg/d\par Folic acid 3mg daily\par

## 2021-03-10 NOTE — ASSESSMENT
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 Has family history. Nl MRIs in past.\par Has developed more anxiety and panic over past week due to personal circumstances, doubt auras for sz.\par \par Plan\par - Reduce Levetiracetam ER to 1000/d and taper off 500/week\par - Continue  oxcarbazepine, MVI\par - continue escitalopram 5mg (may need 10 mg)\par - f/u therapist and psychiatrist\par - cont 6-8 weeks\par

## 2021-03-10 NOTE — PHYSICAL EXAM
[FreeTextEntry1] : Neuro alert and oriented x3\par attn conc lang nl\par Cn intact in detail\par Motor 5/5\par gait nl

## 2021-03-15 ENCOUNTER — RX RENEWAL (OUTPATIENT)
Age: 26
End: 2021-03-15

## 2021-06-01 ENCOUNTER — RX RENEWAL (OUTPATIENT)
Age: 26
End: 2021-06-01

## 2022-02-24 NOTE — ED BEHAVIORAL HEALTH ASSESSMENT NOTE - NS ED BHA TELEPSYCH PATIENT INTERVIEW PRIVATE SPACE
[facility] [place] [facility]  [facility]  [address] [place] [address]      Regina Rodriguez :1984 MRN:[id number]    2022 Time Session Began: 1100 Time Session Ended: 1145    Due to COVID-19 precautions, this visit was performed via live interactive two-way Video visit with patient's verbal consent.   Clinician Location:Home.  Patient Location: Home.  Verified patient identity:  [x] Yes    Session Type: Family with Patient     Others Present: partner    Intervention: Behavioral, Supportive    Suicide/Homicide/Violence Ideation: No    If Yes, explain: NA    Current Outpatient Medications   Medication Sig   • ferrous sulfate 325 (65 FE) MG tablet Take 1 tablet by mouth daily (with breakfast).   • VITAMIN D, ERGOCALCIFEROL, PO    • levETIRAcetam (KepPRA) 500 MG tablet TAKE 1 AND 1/2 TABLETS BY MOUTH TWICE DAILY   • Prenatal Vit-Fe Fumarate-FA (Prenatal/Folic Acid) Tab Take 1 tablet by mouth daily. Dispense whichever prenatal vitamin is covered by patient's insurance.     No current facility-administered medications for this visit.       Change in Medication(s) Reported: No  If Yes, explain: NA    Patient/Family Education Provided: Yes  Patient/Family Displays Understanding: Yes    If No, explain: NA    Chief complaint in patient's own words: \"time management, inattention, insomnia\"     Progress Note containing chief complaint and symptoms/problems related to the complaint:    (Data/Action/Response/Plan)    DATA: Patient presents with her boyfriend to his session.  Patient endorses generally euthymic mood, cognition, and functioning aside from situational stressors.  They discuss ongoing concern regarding their legal situation with patient's children.  Reports that the children have been returned home but will be monitored by DCFS until July.   They described the stress with being judged and scutinized by the system regarding their parenting ability. Patient  and her boyfriend discuss differences in parenting styles that create conflict between the two of them.  Patient reports that her boyfriend is too strict; and he believes she is too lenient.  Patient discusses other concerns regarding his behavior that is mostly attributed to his physical condition (hydrocephalus).  Patient's boyfriend discusses some of his concerns regarding patient.      ACTION: Completed a mood and functioning check.  Engaged in empathic listening and responding and took active steps to foster an environment that demonstrates unconditional positive regard, safety and trust. Processed chief complaint in the context of patient's history and presenting symptoms.  Encouraged positive coping. Problem-solved ways to increase patient’s ability to make mindful and deliberate choices that promote wellbeing.  Psycho-education and interventions were incorporated as indicated.      RESPONSE: Alert, oriented, and engaged. Maintained appropriate eye contact. Mood was anxious and affect mood congruent. Speech of normal rate and rhythm. Thought process was linear, coherent and goal oriented. Insight and judgment was fair.  Level of concentration and attention was fair.  Responded appropriately to therapeutic interventions.     PLAN:  Continue individual therapy to address treatment goals and objectives.     Need for Community Resources Assessed: Yes    Resources Needed: No    If Yes, what resources: NA    Primary Diagnosis: Adjustment disorder with anxious mood  (primary encounter diagnosis)    Treatment Plan: See Treatment Plan; sent through portal on 2-9-22 for written consent     Discharge Plan: N/A    Next Appointment: 3-23-22    Provider:  Caridad Beatty PSYD   Patient interviewed in a private space.

## 2022-03-09 ENCOUNTER — RX RENEWAL (OUTPATIENT)
Age: 27
End: 2022-03-09

## 2022-03-17 ENCOUNTER — APPOINTMENT (OUTPATIENT)
Dept: NEUROLOGY | Facility: CLINIC | Age: 27
End: 2022-03-17
Payer: MEDICAID

## 2022-03-17 VITALS
SYSTOLIC BLOOD PRESSURE: 124 MMHG | HEART RATE: 83 BPM | WEIGHT: 160 LBS | DIASTOLIC BLOOD PRESSURE: 73 MMHG | BODY MASS INDEX: 32.25 KG/M2 | HEIGHT: 59 IN

## 2022-03-17 PROCEDURE — 99213 OFFICE O/P EST LOW 20 MIN: CPT

## 2022-03-17 RX ORDER — OXCARBAZEPINE 600 MG/1
600 TABLET, FILM COATED ORAL
Qty: 180 | Refills: 3 | Status: DISCONTINUED | COMMUNITY
Start: 2018-07-25 | End: 2022-03-17

## 2022-03-21 NOTE — HISTORY OF PRESENT ILLNESS
[Home] : at home, [unfilled] , at the time of the visit. [Medical Office: ([facility])___] : at the medical office located in  [Verbal consent obtained from patient] : the patient, [unfilled] [FreeTextEntry1] : cc- seizures\par \par ***UPDATE:3/17/2022***\par MS DARIAN LOPEZ is here today for a scheduled follow up office visit . She is doing well with no reported interval seizures and has tapered her Keppra down to 500mg daily also on Oxcarbazepine 600mg BID\par Her mood is good and she has no complaints\par \par Meds-\par 500mg at bed time\par Oxcarbazepine 600mg BID\par escitalopram 5mg/d\par Folic acid 3mg daily\par

## 2022-03-21 NOTE — ASSESSMENT
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 Has family history. Nl MRIs in past.\par Has developed more anxiety and panic over past week due to personal circumstances, doubt auras for sz.\par \par Plan\par - D/C Keppra 500mg\par - Continue  [id number] BID, MVI\par - continue escitalopram 5mg \par - annual labbwork\par - 48 HR AEEG to r/o subclinical seizures\par - f/u in 6-12 months with Dr Cameron\par \par

## 2022-07-10 ENCOUNTER — RX RENEWAL (OUTPATIENT)
Age: 27
End: 2022-07-10

## 2022-08-07 ENCOUNTER — RX RENEWAL (OUTPATIENT)
Age: 27
End: 2022-08-07

## 2023-01-05 ENCOUNTER — RX RENEWAL (OUTPATIENT)
Age: 28
End: 2023-01-05

## 2023-02-08 ENCOUNTER — RX RENEWAL (OUTPATIENT)
Age: 28
End: 2023-02-08

## 2023-03-21 ENCOUNTER — RX RENEWAL (OUTPATIENT)
Age: 28
End: 2023-03-21

## 2023-08-25 ENCOUNTER — RX RENEWAL (OUTPATIENT)
Age: 28
End: 2023-08-25

## 2024-02-14 ENCOUNTER — NON-APPOINTMENT (OUTPATIENT)
Age: 29
End: 2024-02-14

## 2024-02-20 ENCOUNTER — RX RENEWAL (OUTPATIENT)
Age: 29
End: 2024-02-20

## 2024-02-21 RX ORDER — OXCARBAZEPINE 600 MG/1
600 TABLET, FILM COATED ORAL TWICE DAILY
Qty: 60 | Refills: 5 | Status: ACTIVE | COMMUNITY
Start: 2022-03-09 | End: 1900-01-01

## 2024-03-26 ENCOUNTER — APPOINTMENT (OUTPATIENT)
Dept: FAMILY MEDICINE | Facility: CLINIC | Age: 29
End: 2024-03-26
Payer: MEDICAID

## 2024-03-26 VITALS
DIASTOLIC BLOOD PRESSURE: 70 MMHG | OXYGEN SATURATION: 98 % | WEIGHT: 180 LBS | SYSTOLIC BLOOD PRESSURE: 98 MMHG | TEMPERATURE: 98.2 F | BODY MASS INDEX: 36.29 KG/M2 | HEART RATE: 82 BPM | HEIGHT: 59 IN

## 2024-03-26 DIAGNOSIS — Z78.9 OTHER SPECIFIED HEALTH STATUS: ICD-10-CM

## 2024-03-26 DIAGNOSIS — G40.311 GENERALIZED IDIOPATHIC EPILEPSY AND EPILEPTIC SYNDROMES, INTRACTABLE, WITH STATUS EPILEPTICUS: ICD-10-CM

## 2024-03-26 DIAGNOSIS — F41.1 GENERALIZED ANXIETY DISORDER: ICD-10-CM

## 2024-03-26 DIAGNOSIS — Z80.0 FAMILY HISTORY OF MALIGNANT NEOPLASM OF DIGESTIVE ORGANS: ICD-10-CM

## 2024-03-26 DIAGNOSIS — Z00.00 ENCOUNTER FOR GENERAL ADULT MEDICAL EXAMINATION W/OUT ABNORMAL FINDINGS: ICD-10-CM

## 2024-03-26 PROCEDURE — G0447 BEHAVIOR COUNSEL OBESITY 15M: CPT | Mod: 59

## 2024-03-26 PROCEDURE — 99385 PREV VISIT NEW AGE 18-39: CPT | Mod: 25

## 2024-03-26 RX ORDER — FOLIC ACID 1 MG/1
1 TABLET ORAL
Qty: 90 | Refills: 5 | Status: DISCONTINUED | COMMUNITY
Start: 2020-02-13 | End: 2024-03-26

## 2024-03-26 RX ORDER — LEVETIRACETAM 500 MG/1
500 TABLET, FILM COATED, EXTENDED RELEASE ORAL
Qty: 90 | Refills: 11 | Status: DISCONTINUED | COMMUNITY
Start: 2018-07-25 | End: 2024-03-26

## 2024-03-26 NOTE — HISTORY OF PRESENT ILLNESS
[FreeTextEntry1] : New patient-CPE [de-identified] : 29 year F presents for annual physical exam. PMH anxiety and epilepsy Last seizure was 2015.  Managed by Neurologist Feeling well with no complaints.  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

## 2024-03-26 NOTE — HEALTH RISK ASSESSMENT
[No] : In the past 12 months have you used drugs other than those required for medical reasons? No [0] : 2) Feeling down, depressed, or hopeless: Not at all (0) [Never] : Never [PHQ-2 Negative - No further assessment needed] : PHQ-2 Negative - No further assessment needed [Excellent] : ~his/her~  mood as  excellent [Audit-CScore] : 0 [de-identified] : exercises at gym, walks [de-identified] : skips lunch, eats carbs [NPL1Cijma] : 0 [EyeExamDate] : 12/23 [None] : None [Alone] : lives alone [Employed] : employed [Single] : single [Fully functional (bathing, dressing, toileting, transferring, walking, feeding)] : Fully functional (bathing, dressing, toileting, transferring, walking, feeding) [Fully functional (using the telephone, shopping, preparing meals, housekeeping, doing laundry, using] : Fully functional and needs no help or supervision to perform IADLs (using the telephone, shopping, preparing meals, housekeeping, doing laundry, using transportation, managing medications and managing finances) [Reports changes in hearing] : Reports no changes in hearing [Reports changes in vision] : Reports no changes in vision [Reports changes in dental health] : Reports no changes in dental health [PapSmearComments] : next week [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

## 2024-03-26 NOTE — COUNSELING
[Potential consequences of obesity discussed] : Potential consequences of obesity discussed [Benefits of weight loss discussed] : Benefits of weight loss discussed [Encouraged to maintain food diary] : Encouraged to maintain food diary [Target Wt Loss Goal ___] : Weight Loss Goals: Target weight loss goal [unfilled] lbs [FreeTextEntry4] : 5

## 2024-04-15 RX ORDER — ESCITALOPRAM OXALATE 10 MG/1
10 TABLET ORAL
Qty: 90 | Refills: 1 | Status: ACTIVE | COMMUNITY
Start: 2024-04-15 | End: 1900-01-01

## 2024-05-08 ENCOUNTER — APPOINTMENT (OUTPATIENT)
Dept: OBGYN | Facility: CLINIC | Age: 29
End: 2024-05-08
Payer: COMMERCIAL

## 2024-05-08 ENCOUNTER — LABORATORY RESULT (OUTPATIENT)
Age: 29
End: 2024-05-08

## 2024-05-08 VITALS
WEIGHT: 180 LBS | HEIGHT: 59 IN | SYSTOLIC BLOOD PRESSURE: 118 MMHG | DIASTOLIC BLOOD PRESSURE: 70 MMHG | BODY MASS INDEX: 36.29 KG/M2

## 2024-05-08 DIAGNOSIS — F41.9 ANXIETY DISORDER, UNSPECIFIED: ICD-10-CM

## 2024-05-08 DIAGNOSIS — Z01.411 ENCOUNTER FOR GYNECOLOGICAL EXAMINATION (GENERAL) (ROUTINE) WITH ABNORMAL FINDINGS: ICD-10-CM

## 2024-05-08 DIAGNOSIS — Z80.49 FAMILY HISTORY OF MALIGNANT NEOPLASM OF OTHER GENITAL ORGANS: ICD-10-CM

## 2024-05-08 DIAGNOSIS — N39.3 STRESS INCONTINENCE (FEMALE) (MALE): ICD-10-CM

## 2024-05-08 DIAGNOSIS — Z87.898 PERSONAL HISTORY OF OTHER SPECIFIED CONDITIONS: ICD-10-CM

## 2024-05-08 DIAGNOSIS — Z82.49 FAMILY HISTORY OF ISCHEMIC HEART DISEASE AND OTHER DISEASES OF THE CIRCULATORY SYSTEM: ICD-10-CM

## 2024-05-08 DIAGNOSIS — Z12.4 ENCOUNTER FOR SCREENING FOR MALIGNANT NEOPLASM OF CERVIX: ICD-10-CM

## 2024-05-08 DIAGNOSIS — Z83.438 FAMILY HISTORY OF OTHER DISORDER OF LIPOPROTEIN METABOLISM AND OTHER LIPIDEMIA: ICD-10-CM

## 2024-05-08 PROCEDURE — 99385 PREV VISIT NEW AGE 18-39: CPT

## 2024-05-08 RX ORDER — ESCITALOPRAM OXALATE 5 MG/1
5 TABLET ORAL
Qty: 90 | Refills: 1 | Status: DISCONTINUED | COMMUNITY
Start: 2021-03-05 | End: 2024-05-08

## 2024-05-08 NOTE — DISCUSSION/SUMMARY
[FreeTextEntry1] : 1) pap performed 2) weight loss and pelvic floor therapy advised for c/o urinary stress/urge incontinence: referral placed 3) pt advised to return to office for genetic testing and to bring copies of mother's genetic test results to send to nate  Return to office as per above.

## 2024-05-08 NOTE — HISTORY OF PRESENT ILLNESS
[Currently Active] : currently active [Men] : men [Condoms] : Condoms [TextBox_4] : Sandra is a 30 y/o G0 who presents today for an annual exam.  She reports issues with urinary stress/urge incontinence.  Weight loss advised and pelvic floor therapy referral initiated.  Her mother was diagnosed with uterine cancer 12/2023 and was subsequently was found to have the Pastor gene. Her MGF passed from colon cancer.   Pt has apt for genetic testing scheduled in [place] in June - advise she could return here for full panel.  She is an RN working for [employer] [FreeTextEntry1] : 5/2/24

## 2024-05-15 LAB
CYTOLOGY CVX/VAG DOC THIN PREP: ABNORMAL
HPV HIGH+LOW RISK DNA PNL CVX: DETECTED

## 2024-05-16 ENCOUNTER — TRANSCRIPTION ENCOUNTER (OUTPATIENT)
Age: 29
End: 2024-05-16

## 2024-05-28 ENCOUNTER — APPOINTMENT (OUTPATIENT)
Dept: OBGYN | Facility: CLINIC | Age: 29
End: 2024-05-28
Payer: COMMERCIAL

## 2024-05-28 ENCOUNTER — LABORATORY RESULT (OUTPATIENT)
Age: 29
End: 2024-05-28

## 2024-05-28 VITALS
HEIGHT: 59 IN | WEIGHT: 182 LBS | BODY MASS INDEX: 36.69 KG/M2 | DIASTOLIC BLOOD PRESSURE: 70 MMHG | SYSTOLIC BLOOD PRESSURE: 120 MMHG

## 2024-05-28 DIAGNOSIS — R87.612 LOW GRADE SQUAMOUS INTRAEPITHELIAL LESION ON CYTOLOGIC SMEAR OF CERVIX (LGSIL): ICD-10-CM

## 2024-05-28 DIAGNOSIS — B97.7 PAPILLOMAVIRUS AS THE CAUSE OF DISEASES CLASSIFIED ELSEWHERE: ICD-10-CM

## 2024-05-28 LAB — HCG UR QL: NEGATIVE

## 2024-05-28 PROCEDURE — 57500 BIOPSY OF CERVIX: CPT

## 2024-05-28 PROCEDURE — 81025 URINE PREGNANCY TEST: CPT

## 2024-05-28 NOTE — PLAN
[FreeTextEntry1] : 1) procedure and purpose of reviewed with patient as was pap  2) post- procedure instructions reviewed  results in 7-10 days, recommended f/u pending receipt of results.

## 2024-05-28 NOTE — PROCEDURE
[Colposcopy] : Colposcopy  [LGSIL] : LGSIL [HPV High Risk] : HPV high risk [Ibuprofen ___mg] : Ibuprofen ~Vmg [Colposcopy Adequate] : colposcopy adequate [ECC Performed] : ECC performed [Lesion] : lesion seen [Biopsy] : biopsy taken [Hemostasis Obtained] : Hemostasis obtained [Tolerated Well] : the patient tolerated the procedure well [de-identified] : 3 [de-identified] : 7 o'clock [de-identified] : 1) ECC 2 1 o'clock 3) 7 o'clock

## 2024-06-04 ENCOUNTER — TRANSCRIPTION ENCOUNTER (OUTPATIENT)
Age: 29
End: 2024-06-04

## 2024-06-04 ENCOUNTER — NON-APPOINTMENT (OUTPATIENT)
Age: 29
End: 2024-06-04

## 2024-06-05 ENCOUNTER — APPOINTMENT (OUTPATIENT)
Dept: OBGYN | Facility: CLINIC | Age: 29
End: 2024-06-05
Payer: COMMERCIAL

## 2024-06-05 VITALS
WEIGHT: 182 LBS | DIASTOLIC BLOOD PRESSURE: 70 MMHG | SYSTOLIC BLOOD PRESSURE: 110 MMHG | BODY MASS INDEX: 36.69 KG/M2 | HEIGHT: 59 IN

## 2024-06-05 DIAGNOSIS — Z13.79 ENCOUNTER FOR OTHER SCREENING FOR GENETIC AND CHROMOSOMAL ANOMALIES: ICD-10-CM

## 2024-06-05 PROCEDURE — 99212 OFFICE O/P EST SF 10 MIN: CPT | Mod: 25

## 2024-06-05 NOTE — DISCUSSION/SUMMARY
[FreeTextEntry1] : 1) form completed with patient 2) blood work drawn  pt advised results will take 7-10 days

## 2024-06-05 NOTE — HISTORY OF PRESENT ILLNESS
[FreeTextEntry1] : Sandra presents today for genetic testing. Her mother was diagnosed with cervical cancer and subsequently found to have Pastor syndrome.  PT is aware if testing not covered, cost would be $250 out of pocket.  We reviewed her negative colposcopy and she was advised to return for a repeat pap in 6 months

## 2024-06-17 ENCOUNTER — APPOINTMENT (OUTPATIENT)
Dept: NEUROLOGY | Facility: CLINIC | Age: 29
End: 2024-06-17

## 2024-06-21 ENCOUNTER — NON-APPOINTMENT (OUTPATIENT)
Age: 29
End: 2024-06-21

## 2024-06-21 ENCOUNTER — TRANSCRIPTION ENCOUNTER (OUTPATIENT)
Age: 29
End: 2024-06-21

## 2024-06-25 ENCOUNTER — APPOINTMENT (OUTPATIENT)
Dept: NEUROLOGY | Facility: CLINIC | Age: 29
End: 2024-06-25
Payer: COMMERCIAL

## 2024-06-25 ENCOUNTER — TRANSCRIPTION ENCOUNTER (OUTPATIENT)
Age: 29
End: 2024-06-25

## 2024-06-25 PROCEDURE — G2211 COMPLEX E/M VISIT ADD ON: CPT

## 2024-06-25 PROCEDURE — 99213 OFFICE O/P EST LOW 20 MIN: CPT

## 2024-07-31 ENCOUNTER — APPOINTMENT (OUTPATIENT)
Dept: PEDIATRIC MEDICAL GENETICS | Facility: CLINIC | Age: 29
End: 2024-07-31

## 2024-08-14 ENCOUNTER — RX RENEWAL (OUTPATIENT)
Age: 29
End: 2024-08-14

## 2024-08-22 ENCOUNTER — APPOINTMENT (OUTPATIENT)
Dept: OBGYN | Facility: CLINIC | Age: 29
End: 2024-08-22
Payer: COMMERCIAL

## 2024-08-22 VITALS
SYSTOLIC BLOOD PRESSURE: 118 MMHG | WEIGHT: 182 LBS | HEIGHT: 59 IN | BODY MASS INDEX: 36.69 KG/M2 | DIASTOLIC BLOOD PRESSURE: 70 MMHG

## 2024-08-22 DIAGNOSIS — T19.2XXA FOREIGN BODY IN VULVA AND VAGINA, INITIAL ENCOUNTER: ICD-10-CM
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
## 2024-08-22 PROCEDURE — 99214 OFFICE O/P EST MOD 30 MIN: CPT

## 2024-08-22 NOTE — PHYSICAL EXAM
[Labia Majora] : labia major [Labia Minora] : labia minora [Normal] : clitoris [FreeTextEntry4] : vaginal cup seen: grasped with ring forcep and removed

## 2024-10-07 ENCOUNTER — RX RENEWAL (OUTPATIENT)
Age: 29
End: 2024-10-07

## 2024-12-11 ENCOUNTER — APPOINTMENT (OUTPATIENT)
Dept: OBGYN | Facility: CLINIC | Age: 29
End: 2024-12-11

## 2024-12-12 ENCOUNTER — TRANSCRIPTION ENCOUNTER (OUTPATIENT)
Age: 29
End: 2024-12-12

## 2025-01-23 ENCOUNTER — APPOINTMENT (OUTPATIENT)
Dept: OBGYN | Facility: CLINIC | Age: 30
End: 2025-01-23
Payer: COMMERCIAL

## 2025-01-23 ENCOUNTER — LABORATORY RESULT (OUTPATIENT)
Age: 30
End: 2025-01-23

## 2025-01-23 VITALS
HEIGHT: 59 IN | SYSTOLIC BLOOD PRESSURE: 112 MMHG | DIASTOLIC BLOOD PRESSURE: 76 MMHG | BODY MASS INDEX: 38.1 KG/M2 | WEIGHT: 189 LBS

## 2025-01-23 DIAGNOSIS — B97.7 PAPILLOMAVIRUS AS THE CAUSE OF DISEASES CLASSIFIED ELSEWHERE: ICD-10-CM

## 2025-01-23 DIAGNOSIS — R87.612 LOW GRADE SQUAMOUS INTRAEPITHELIAL LESION ON CYTOLOGIC SMEAR OF CERVIX (LGSIL): ICD-10-CM

## 2025-01-23 PROCEDURE — 99213 OFFICE O/P EST LOW 20 MIN: CPT

## 2025-01-24 LAB — HPV HIGH+LOW RISK DNA PNL CVX: DETECTED

## 2025-01-30 LAB — CYTOLOGY CVX/VAG DOC THIN PREP: ABNORMAL

## 2025-02-10 ENCOUNTER — RX RENEWAL (OUTPATIENT)
Age: 30
End: 2025-02-10

## 2025-04-28 ENCOUNTER — APPOINTMENT (OUTPATIENT)
Dept: FAMILY MEDICINE | Facility: CLINIC | Age: 30
End: 2025-04-28
Payer: COMMERCIAL

## 2025-04-28 VITALS
TEMPERATURE: 97.7 F | SYSTOLIC BLOOD PRESSURE: 116 MMHG | WEIGHT: 182 LBS | DIASTOLIC BLOOD PRESSURE: 76 MMHG | BODY MASS INDEX: 36.69 KG/M2 | OXYGEN SATURATION: 99 % | HEART RATE: 79 BPM | HEIGHT: 59 IN

## 2025-04-28 DIAGNOSIS — D64.9 ANEMIA, UNSPECIFIED: ICD-10-CM

## 2025-04-28 DIAGNOSIS — Z00.00 ENCOUNTER FOR GENERAL ADULT MEDICAL EXAMINATION W/OUT ABNORMAL FINDINGS: ICD-10-CM

## 2025-04-28 PROCEDURE — 99395 PREV VISIT EST AGE 18-39: CPT | Mod: 25

## 2025-04-28 PROCEDURE — G0447 BEHAVIOR COUNSEL OBESITY 15M: CPT | Mod: 59

## 2025-05-01 LAB
ALBUMIN SERPL ELPH-MCNC: 4.4 G/DL
ALP BLD-CCNC: 88 U/L
ALT SERPL-CCNC: 19 U/L
ANION GAP SERPL CALC-SCNC: 16 MMOL/L
APPEARANCE: CLEAR
AST SERPL-CCNC: 19 U/L
BASOPHILS # BLD AUTO: 0.03 K/UL
BASOPHILS NFR BLD AUTO: 0.7 %
BILIRUB SERPL-MCNC: 0.5 MG/DL
BILIRUBIN URINE: NEGATIVE
BLOOD URINE: NEGATIVE
BUN SERPL-MCNC: 11 MG/DL
CALCIUM SERPL-MCNC: 9.4 MG/DL
CHLORIDE SERPL-SCNC: 103 MMOL/L
CHOLEST SERPL-MCNC: 237 MG/DL
CO2 SERPL-SCNC: 20 MMOL/L
COLOR: YELLOW
CREAT SERPL-MCNC: 0.72 MG/DL
EGFRCR SERPLBLD CKD-EPI 2021: 114 ML/MIN/1.73M2
EOSINOPHIL # BLD AUTO: 0.02 K/UL
EOSINOPHIL NFR BLD AUTO: 0.5 %
FERRITIN SERPL-MCNC: 29 NG/ML
GLUCOSE QUALITATIVE U: NEGATIVE MG/DL
GLUCOSE SERPL-MCNC: 98 MG/DL
HCT VFR BLD CALC: 41 %
HDLC SERPL-MCNC: 45 MG/DL
HGB BLD-MCNC: 13.5 G/DL
IMM GRANULOCYTES NFR BLD AUTO: 0.2 %
KETONES URINE: NEGATIVE MG/DL
LDLC SERPL-MCNC: 165 MG/DL
LEUKOCYTE ESTERASE URINE: NEGATIVE
LYMPHOCYTES # BLD AUTO: 1.38 K/UL
LYMPHOCYTES NFR BLD AUTO: 33.3 %
MAN DIFF?: NORMAL
MCHC RBC-ENTMCNC: 30.2 PG
MCHC RBC-ENTMCNC: 32.9 G/DL
MCV RBC AUTO: 91.7 FL
MONOCYTES # BLD AUTO: 0.3 K/UL
MONOCYTES NFR BLD AUTO: 7.2 %
NEUTROPHILS # BLD AUTO: 2.4 K/UL
NEUTROPHILS NFR BLD AUTO: 58.1 %
NITRITE URINE: NEGATIVE
NONHDLC SERPL-MCNC: 192 MG/DL
PH URINE: 5.5
PLATELET # BLD AUTO: 244 K/UL
POTASSIUM SERPL-SCNC: 4.5 MMOL/L
PROT SERPL-MCNC: 7.6 G/DL
PROTEIN URINE: NEGATIVE MG/DL
RBC # BLD: 4.47 M/UL
RBC # FLD: 13.8 %
SODIUM SERPL-SCNC: 139 MMOL/L
SPECIFIC GRAVITY URINE: 1.02
TRIGL SERPL-MCNC: 147 MG/DL
UROBILINOGEN URINE: 0.2 MG/DL
WBC # FLD AUTO: 4.14 K/UL

## 2025-06-25 ENCOUNTER — APPOINTMENT (OUTPATIENT)
Dept: NEUROLOGY | Facility: CLINIC | Age: 30
End: 2025-06-25

## 2025-07-10 ENCOUNTER — APPOINTMENT (OUTPATIENT)
Dept: OBGYN | Facility: CLINIC | Age: 30
End: 2025-07-10
Payer: COMMERCIAL

## 2025-07-10 VITALS
DIASTOLIC BLOOD PRESSURE: 70 MMHG | BODY MASS INDEX: 36.29 KG/M2 | WEIGHT: 180 LBS | HEIGHT: 59 IN | SYSTOLIC BLOOD PRESSURE: 118 MMHG

## 2025-07-10 PROBLEM — T19.2XXA RETAINED TAMPON, INITIAL ENCOUNTER: Status: RESOLVED | Noted: 2024-08-22 | Resolved: 2025-07-10

## 2025-07-10 PROBLEM — Z01.419 ENCOUNTER FOR GYNECOLOGICAL EXAMINATION (GENERAL) (ROUTINE) WITHOUT ABNORMAL FINDINGS: Status: ACTIVE | Noted: 2025-07-10

## 2025-07-10 PROCEDURE — 99395 PREV VISIT EST AGE 18-39: CPT

## 2025-07-14 LAB — HPV HIGH+LOW RISK DNA PNL CVX: NOT DETECTED

## 2025-07-15 LAB — CYTOLOGY CVX/VAG DOC THIN PREP: ABNORMAL

## 2025-08-12 ENCOUNTER — RX RENEWAL (OUTPATIENT)
Age: 30
End: 2025-08-12

## 2025-09-15 ENCOUNTER — NON-APPOINTMENT (OUTPATIENT)
Age: 30
End: 2025-09-15

## 2025-09-17 ENCOUNTER — NON-APPOINTMENT (OUTPATIENT)
Age: 30
End: 2025-09-17

## 2025-09-17 ENCOUNTER — APPOINTMENT (OUTPATIENT)
Dept: NEUROLOGY | Facility: CLINIC | Age: 30
End: 2025-09-17
Payer: COMMERCIAL

## 2025-09-17 VITALS
SYSTOLIC BLOOD PRESSURE: 102 MMHG | WEIGHT: 181 LBS | BODY MASS INDEX: 36.49 KG/M2 | HEART RATE: 81 BPM | DIASTOLIC BLOOD PRESSURE: 58 MMHG | OXYGEN SATURATION: 98 % | HEIGHT: 59 IN

## 2025-09-17 DIAGNOSIS — G40.909 EPILEPSY, UNSPECIFIED, NOT INTRACTABLE, W/OUT STATUS EPILEPTICUS: ICD-10-CM

## 2025-09-17 PROCEDURE — 99213 OFFICE O/P EST LOW 20 MIN: CPT
